# Patient Record
Sex: MALE | URBAN - METROPOLITAN AREA
[De-identification: names, ages, dates, MRNs, and addresses within clinical notes are randomized per-mention and may not be internally consistent; named-entity substitution may affect disease eponyms.]

---

## 2023-01-12 ENCOUNTER — APPOINTMENT (OUTPATIENT)
Dept: URBAN - METROPOLITAN AREA CLINIC 193 | Age: 63
Setting detail: DERMATOLOGY
End: 2023-01-12

## 2023-01-12 PROBLEM — C44.92 SQUAMOUS CELL CARCINOMA OF SKIN, UNSPECIFIED: Status: ACTIVE | Noted: 2023-01-12

## 2023-01-12 PROCEDURE — OTHER MOHS SURGERY PHONE CONSULTATION: OTHER

## 2023-01-12 NOTE — PROCEDURE: MOHS SURGERY PHONE CONSULTATION
Date Of Mohs Surgery: 03/23/2023
Has The Patient Ever Had A Heart Attack Or Stroke?: No
Additional Information?: Pt. reported he has not had the vaccine or boosters but has had COVID twice.  Pt. scheduled 3/23/2023 10:00 am with [facility]
Has The Pathology Report Been Received?: Yes
Which Antibiotic Do They Take For Surgical Prophylaxis?: Amoxicillin (2 grams)
If Yes- What Blood Thinners Do They Take?: fish oil
Referring Provider: Paz Blackmon
Time Of Mohs Surgery: 10:00
Detail Level: Simple
Patient Reported Location: right medial malar cheek

## 2023-03-23 ENCOUNTER — APPOINTMENT (OUTPATIENT)
Dept: URBAN - METROPOLITAN AREA CLINIC 193 | Age: 63
Setting detail: DERMATOLOGY
End: 2023-03-24

## 2023-03-23 DIAGNOSIS — Z85.828 PERSONAL HISTORY OF OTHER MALIGNANT NEOPLASM OF SKIN: ICD-10-CM

## 2023-03-23 PROBLEM — C44.329 SQUAMOUS CELL CARCINOMA OF SKIN OF OTHER PARTS OF FACE: Status: ACTIVE | Noted: 2023-03-23

## 2023-03-23 PROCEDURE — 13132 CMPLX RPR F/C/C/M/N/AX/G/H/F: CPT

## 2023-03-23 PROCEDURE — OTHER MOHS SURGERY: OTHER

## 2023-03-23 PROCEDURE — 17311 MOHS 1 STAGE H/N/HF/G: CPT

## 2023-03-23 PROCEDURE — OTHER RETURN TO REFERRING PROVIDER: OTHER

## 2023-03-23 NOTE — PROCEDURE: MOHS SURGERY
1.64 Lazy S Intermediate Repair Preamble Text (Leave Blank If You Do Not Want): Undermining was performed with blunt dissection.

## 2023-03-23 NOTE — PROCEDURE: MOHS SURGERY
07/28/21                            Fanny Edward  [address] [facility] [place] [address]    To Whom It May Concern:    This is to certify Fanny Edward was evaluated with Dante Haddad PA-C on 07/28/21 and can return to light work on 7/31/2021.     RESTRICTIONS:  1 lb lifting restriction.  Patient should be excused from prolonged standing, walking.  Patient should be allowed to rest for 10-15 minutes secondary to pain.  These restrictions are in place for 2 weeks from the date listed above.  Patient may return to regular work duties per the patient's discretion if improvement of pain.              Dante Haddad PA-C  [address] [address]  Phone: [phone]  Fax: [phone]     Posterior Auricular Interpolation Flap Text: A decision was made to reconstruct the defect utilizing an interpolation axial flap and a staged reconstruction.  A telfa template was made of the defect.  This telfa template was then used to outline the posterior auricular interpolation flap.  The donor area for the pedicle flap was then injected with anesthesia.  The flap was excised through the skin and subcutaneous tissue down to the layer of the underlying musculature.  The pedicle flap was carefully excised within this deep plane to maintain its blood supply.  The edges of the donor site were undermined.   The donor site was closed in a primary fashion.  The pedicle was then rotated into position and sutured.  Once the tube was sutured into place, adequate blood supply was confirmed with blanching and refill.  The pedicle was then wrapped with xeroform gauze and dressed appropriately with a telfa and gauze bandage to ensure continued blood supply and protect the attached pedicle.

## 2025-05-23 ENCOUNTER — APPOINTMENT (OUTPATIENT)
Dept: URBAN - METROPOLITAN AREA CLINIC 193 | Age: 65
Setting detail: DERMATOLOGY
End: 2025-05-23

## 2025-05-23 PROBLEM — C44.92 SQUAMOUS CELL CARCINOMA OF SKIN, UNSPECIFIED: Status: ACTIVE | Noted: 2025-05-23

## 2025-05-23 PROCEDURE — OTHER MOHS SURGERY PHONE CONSULTATION: OTHER

## 2025-06-02 ENCOUNTER — APPOINTMENT (OUTPATIENT)
Dept: URBAN - METROPOLITAN AREA CLINIC 193 | Age: 65
Setting detail: DERMATOLOGY
End: 2025-06-02

## 2025-06-02 PROBLEM — C44.329 SQUAMOUS CELL CARCINOMA OF SKIN OF OTHER PARTS OF FACE: Status: ACTIVE | Noted: 2025-06-02

## 2025-06-02 PROCEDURE — OTHER MOHS SURGERY: OTHER

## 2025-06-02 PROCEDURE — 17311 MOHS 1 STAGE H/N/HF/G: CPT

## 2025-06-02 PROCEDURE — OTHER RETURN TO REFERRING PROVIDER: OTHER

## 2025-06-02 PROCEDURE — 13132 CMPLX RPR F/C/C/M/N/AX/G/H/F: CPT
